# Patient Record
(demographics unavailable — no encounter records)

---

## 2017-02-13 NOTE — L&D FLOW SHEET
=================================================================

LD Flowsheet

=================================================================

Datetime Report Generated by CPN: 02/13/2017 20:00

   

   

=================================================================

Datetime: 02/13/2017 19:58

=================================================================

   

 I/O Interventions:  Clear Liquids Given (Crystal Peoria, RN)

   

=================================================================

Datetime: 02/13/2017 19:50

=================================================================

   

   

=================================================================

Uterine Activity

=================================================================

   

 Monitor Mode:  External; Palpation (Crystal Milad, RN)

 Frequency (min):  1 (Crystal Peoria, RN)

 Quality:  Mild (Crystal Milad, RN)

 Duration (sec):  50 (Crystal Peoria, RN)

 Resting Tone (Palpate):  Relaxed (Crystal Peoria, RN)

 Contraction Comments:  Pt reports most pain is in her back (Crystal

   Peoria, RN)

   

=================================================================

Fetal Assessment A

=================================================================

   

 Monitor Mode:  External US (Crystal Peoria, RN)

 FHR Baseline Rate :  145 (Crystal Peoria, RN)

 Variability:  Moderate 6-25 bpm (Crystal Milad, RN)

 Accelerations:  15X15 (Crystal Milad, RN)

 Decelerations:  None (Crystal Milad, RN)

   

=================================================================

Datetime: 02/13/2017 19:44

=================================================================

   

 NBP Sys/Alma Delia/Mean (mmHg):  103 (QS system process)

:  59 (QS system process)

:  78 (QS system process)

 Pulse:  96 (QS system process)

 LaborFlag:  OB Triage (QS system process)

   

=================================================================

Datetime: 02/13/2017 19:41

=================================================================

   

 I/O Interventions:  Up to BR (Crystal Peoria, RN)

   

=================================================================

Datetime: 02/13/2017 19:36

=================================================================

   

   

=================================================================

Pain

=================================================================

   

 Pain Scale:  4 (Crystal Peoria, RN)

 Pain Presence:  Intermittent (Crystal Milad, RN)

 Pain Type:  Sharp; Stabbing; Ache (Crystal Milad, RN)

 Pain Location:  Abdomen; Back; Perineum (Crystal Peoria, RN)

 Pain Goal:  1 (Crystal Milad, RN)

 Pain Relief Measures:  Comfort Measures (Crystal Milad, RN)

 Pain Coping:  Talking Through Contractions (Crystal Peoria, RN)

 Vaginal Bleeding:  None (Crystal Peoria, RN)

   

=================================================================

Maternal Assessment

=================================================================

   

 Level of Consciousness:  Fully Conscious (Crystal Peoria, RN)

 DTR's/Clonus:  DTRs 1+; No Clonus (Crystal Milad, RN)

 Headache:  Denies (Crystal Peoria, RN)

 Breath Sounds, Left:  Clear and Equal (Crystal Peoria, RN)

 Breath Sounds, Right:  Clear and Equal (Crystal Milad, RN)

 Nausea/Vomiting:  Denies (Crystal Peoria, RN)

 RUQ Epigastric Pain:  Denies (Crystal Imlad, RN)

 LaborFlag:  OB Triage (QS system process)

   

=================================================================

Datetime: 02/13/2017 19:23

=================================================================

   

   

=================================================================

Vital Signs

=================================================================

   

Stage of Pregnancy:  OB Triage (Ifrah Araujo, RN)

   

=================================================================

Vaginal Exam

=================================================================

   

 Dilatation (cm):  2.5 (Ifrah Araujo RN)

 Effacement (%):  50 (Ifrah Araujo RN)

 Station:  -2 (Ifrah Araujo RN)

 Exam by:  Dr. Leong (Ifrah Araujo RN)

 Vaginal Bleeding:  None (Ifrah Araujo RN)

 Cervix, Consistency:  Soft (Ifrah Araujo RN)

 Cervix, Position:  Midposition (Ifrah Araujo RN)

 Provider Reviewed Strip:  Yes (Ifrah Araujo RN)

 Strip Reviewed by:  ZAINAB araujo RN  (Ifrah Araujo RN)

   

=================================================================

Communication

=================================================================

   

 Communication:  Provider at Bedside (Crystal Milad, RN)

 Provider Notified (Name):  Salo (Crystal Milad, RN)

 Notification Reason:  Membrane Status (Crystal Peoria, RN)

   

=================================================================

Datetime: 02/13/2017 19:17

=================================================================

   

   

=================================================================

Patient Care

=================================================================

   

 Patient Position/Activity:  Supine (Crystal Peoria, RN)

## 2017-02-15 NOTE — NON STRESS TEST REPORT
=================================================================

Non Stress Test

=================================================================

Datetime Report Generated by CPN: 02/15/2017 06:26

   

   

=================================================================

DEMOGRAPHIC

=================================================================

   

Test Number:  2

EGA NST:  37.6

   

=================================================================

INDICATION

=================================================================

   

Indication for Study:  Ordered by Provider

Indication for Study (NST) Other:  LC

   

=================================================================

MONITORING

=================================================================

   

Monitor Explained:  Monitor Explained; Test Explained; Patient

   Verbalized Understanding

Time on Monitor:  02/15/2017 05:00

Time off Monitor:  02/15/2017 05:54

NST Duration:  54

   

=================================================================

NST INTERVENTIONS

=================================================================

   

NST Interventions:  PO Hydration; Reposition Patient

Physician Notified NST:  Dr. Valentine

   

=================================================================

BABY A

=================================================================

   

Fetal Movement :  Present

Contraction Frequency :  10-10.5

FHR Baseline :  130

Accelerations :  15X15

Decelerations :  None

Variability :  Moderate 6-25bpm

NST Review:  Meets Criteria for Reactive NST

NST Review and Verified By :  HERNANDO Mason RN

NST Results:  Reactive

   

=================================================================

NST REPORT

=================================================================

   

Report Trigger:  Send Report

## 2017-02-15 NOTE — NON STRESS TEST REPORT
=================================================================

Non Stress Test

=================================================================

Datetime Report Generated by CPN: 02/15/2017 04:50

   

   

=================================================================

DEMOGRAPHIC

=================================================================

   

Test Number:  1

EGA NST:  37.4

   

=================================================================

INDICATION

=================================================================

   

Indication for Study:  Ordered by Provider

   

=================================================================

VITAL SIGNS

=================================================================

   

Pulse - NST:  96

RESP - NST:  18

NBPSYS NST:  103

NBPDIA NST:  59

   

=================================================================

MONITORING

=================================================================

   

Monitor Explained:  Monitor Explained; Test Explained; Patient

   Verbalized Understanding

Time on Monitor:  02/13/2017 19:09

Time off Monitor:  02/13/2017 21:30

NST Duration:  141

   

=================================================================

NST INTERVENTIONS

=================================================================

   

NST Interventions:  PO Hydration; Oxytocin Challenge Test

Physician Notified NST:  Leong

BABY A:  [id number]

   

=================================================================

BABY A

=================================================================

   

Fetal Movement :  Present

Contraction Frequency :  5-10

FHR Baseline :  130

Accelerations :  15X15

Decelerations :  None

Variability :  Moderate 6-25bpm

NST Review:  Meets Criteria for Reactive NST

NST Review and Verified By :  LEONEL Vargas RN

NST Results:  Reactive

   

=================================================================

NST REPORT

=================================================================

   

Report Trigger:  Send Report

## 2017-02-15 NOTE — L&D FLOW SHEET
=================================================================

LD Flowsheet

=================================================================

Datetime Report Generated by CPN: 02/15/2017 14:00

   

   

=================================================================

Datetime: 02/15/2017 13:55

=================================================================

   

 NBP Sys/Alma Delia/Mean (mmHg):  120 (QS system process)

:  68 (QS system process)

:  87 (QS system process)

 Pulse:  91 (QS system process)

 LaborFlag:  OB Triage (QS system process)

   

=================================================================

Datetime: 02/15/2017 13:40

=================================================================

   

 NBP Sys/Alma Delia/Mean (mmHg):  106 (QS system process)

:  67 (QS system process)

:  81 (QS system process)

 Pulse:  93 (QS system process)

 LaborFlag:  OB Triage (QS system process)

   

=================================================================

Datetime: 02/15/2017 13:30

=================================================================

   

 Pitocin (milliunit):  Pitocin Started (milliunits) @ 4 (Mariaa

   Remi, RNC)

   

=================================================================

Datetime: 02/15/2017 13:26

=================================================================

   

 NBP Sys/Alma Delia/Mean (mmHg):  111 (QS system process)

:  60 (QS system process)

:  80 (QS system process)

 Pulse:  108 (QS system process)

 LaborFlag:  OB Triage (QS system process)

   

=================================================================

Datetime: 02/15/2017 13:12

=================================================================

   

 NBP Sys/Alma Delia/Mean (mmHg):  114 (QS system process)

:  53 (QS system process)

:  77 (QS system process)

 Pulse:  93 (QS system process)

 LaborFlag:  OB Triage (QS system process)

   

=================================================================

Datetime: 02/15/2017 13:04

=================================================================

   

 Pushing Position:  Pushing with Contractions (Mraiaa Khalil RN)

   

=================================================================

Datetime: 02/15/2017 13:00

=================================================================

   

 Dilatation (cm):  10.0 (SUHA Squires)

 Effacement (%):  100 (SUHA Squires)

 Station:  2 (SUHA Squires)

 Exam by:  MIREYA Diamond CNM (SUHA Squires)

   

=================================================================

Datetime: 02/15/2017 12:55

=================================================================

   

 NBP Sys/Alma Delia/Mean (mmHg):  113 (QS system process)

:  67 (QS system process)

:  84 (QS system process)

 Pulse:  87 (QS system process)

 LaborFlag:  OB Triage (QS system process)

   

=================================================================

Datetime: 02/15/2017 12:45

=================================================================

   

 Monitor Mode:  External; Palpation (Mariaa Remi, RNC)

 Frequency (min):  2.5-3 (Mariaa Remi, RNC)

 Quality:  Mild (Mariaa Remi, RNC)

 Duration (sec):  60-90 (Mariaa Remi, RNC)

 Duration Criteria:  Less than Two 120 Second Contractions (Mariaa

   Remi, RNC)

 Pattern:  Normal: <= 5 Contractions in 10 Minutes (Mariaa Remi,

   RNC)

 Resting Tone (Palpate):  Relaxed (Mariaa Remi, RNC)

 Monitor Mode:  External US (Mariaa Remi, RNC)

 FHR Baseline Rate :  135 (Mariaa Remi, RNC)

 Variability:  Moderate 6-25 bpm (Mariaa Remi, RNC)

 Accelerations:  15X15 (Mariaa Remi, RNC)

 Decelerations:  None (Mariaa Remi, RNC)

   

=================================================================

Datetime: 02/15/2017 12:40

=================================================================

   

 I/O Interventions:  [place]ashburn Cath Inserted (Mariaa Remi, RNC)

   

=================================================================

Datetime: 02/15/2017 12:39

=================================================================

   

 NBP Sys/Alma Delia/Mean (mmHg):  109 (QS system process)

:  67 (QS system process)

:  81 (QS system process)

 Pulse:  78 (QS system process)

 LaborFlag:  OB Triage (QS system process)

   

=================================================================

Datetime: 02/15/2017 12:38

=================================================================

   

 NBP Sys/Alma Delia/Mean (mmHg):  113 (QS system process)

:  67 (QS system process)

:  84 (QS system process)

 Pulse:  87 (QS system process)

 LaborFlag:  OB Triage (QS system process)

   

=================================================================

Datetime: 02/15/2017 12:37

=================================================================

   

 NBP Sys/Alma Delia/Mean (mmHg):  107 (QS system process)

:  63 (QS system process)

:  80 (QS system process)

 Pulse:  96 (QS system process)

 LaborFlag:  OB Triage (QS system process)

   

=================================================================

Datetime: 02/15/2017 12:36

=================================================================

   

 NBP Sys/Alma Delia/Mean (mmHg):  114 (QS system process)

:  77 (QS system process)

:  90 (QS system process)

 Pulse:  93 (QS system process)

 LaborFlag:  OB Triage (QS system process)

   

=================================================================

Datetime: 02/15/2017 12:35

=================================================================

   

 NBP Sys/Alma Delia/Mean (mmHg):  113 (QS system process)

:  68 (QS system process)

:  85 (QS system process)

 Pulse:  97 (QS system process)

 LaborFlag:  OB Triage (QS system process)

   

=================================================================

Datetime: 02/15/2017 12:34

=================================================================

   

 NBP Sys/Alma Delia/Mean (mmHg):  107 (QS system process)

:  61 (QS system process)

:  78 (QS system process)

 Pulse:  87 (QS system process)

 LaborFlag:  OB Triage (QS system process)

   

=================================================================

Datetime: 02/15/2017 12:33

=================================================================

   

 NBP Sys/Alma Delia/Mean (mmHg):  104 (QS system process)

:  60 (QS system process)

:  75 (QS system process)

 Pulse:  88 (QS system process)

 LaborFlag:  OB Triage (QS system process)

   

=================================================================

Datetime: 02/15/2017 12:32

=================================================================

   

 NBP Sys/Alma Delia/Mean (mmHg):  105 (QS system process)

:  60 (QS system process)

:  76 (QS system process)

 Pulse:  82 (QS system process)

 LaborFlag:  OB Triage (QS system process)

   

=================================================================

Datetime: 02/15/2017 12:31

=================================================================

   

 NBP Sys/Alma Delia/Mean (mmHg):  108 (QS system process)

:  63 (QS system process)

:  79 (QS system process)

 Pulse:  82 (QS system process)

 LaborFlag:  OB Triage (QS system process)

   

=================================================================

Datetime: 02/15/2017 12:30

=================================================================

   

 Monitor Mode:  External; Palpation (Mariaa Khalil RNC)

 Frequency (min):  3-4 (Mariaa Remi, RNC)

 Quality:  Moderate to Strong (Mariaa Remi, RNC)

 Duration (sec):  60-90 (Mariaa Remi, RNC)

 Duration Criteria:  Less than Two 120 Second Contractions (Mariaa

   Remi, RNC)

 Pattern:  Normal: <= 5 Contractions in 10 Minutes (Mariaa Remi,

   RNC)

 Resting Tone (Palpate):  Relaxed (Mariaa Remi, RNC)

 Monitor Mode:  External US (Mariaa Remi, RNC)

 FHR Baseline Rate :  135 (Mariaa Remi, RNC)

 Variability:  Moderate 6-25 bpm (Mariaa Remi, RNC)

 Accelerations:  15X15 (Mariaa Remi, RNC)

 Decelerations:  None (Mariaa Remi, RNC)

   

=================================================================

Datetime: 02/15/2017 12:27

=================================================================

   

 Pulse:  83 (QS system process)

 SpO2 (%):  100 (QS system process)

 LaborFlag:  OB Triage (QS system process)

   

=================================================================

Datetime: 02/15/2017 12:25

=================================================================

   

 Procedure Verify:  Correct Patient Identity; Correct Side and Site are

   Marked; Accurate Procedure Consent Form; Agreement on Procedure to

   be Done; Correct Patient Position; Relevant Images and Results are

   Properly Labeled and Displayed; Addressed Need to Administer

   Antibiotics or Fluids for Irrigation; Safety Precautions Based on

   Patient History or Medication Use (SUHA Squires)

 Anesthesia Plans:  Epidural (SUHA Squires)

   

=================================================================

Datetime: 02/15/2017 12:15

=================================================================

   

 IV/Blood Work:  New IV Bag Hung; IV Bag Number @ 3 (SUHA Squires)

   

=================================================================

Datetime: 02/15/2017 12:00

=================================================================

   

 Monitor Mode:  External; Palpation (SUHA Squires)

 Frequency (min):  5-6 (SUHA Squires)

 Quality:  Moderate to Strong (SUHA Squires)

 Duration (sec):  50-80 (SUHA Squires)

 Duration Criteria:  Less than Two 120 Second Contractions (SUHA Squires)

 Pattern:  Normal: <= 5 Contractions in 10 Minutes (SUHA Squires)

 Resting Tone (Palpate):  Relaxed (SUHA Squires)

 Monitor Mode:  External US (SUHA Squires)

 FHR Baseline Rate :  135 (SUHA Squires)

 Variability:  Moderate 6-25 bpm (SUHA Squires)

 Accelerations:  15X15 (SUHA Squires)

 Decelerations:  None (SUHA Squires)

## 2017-02-15 NOTE — L&D FLOW SHEET
=================================================================

LD Flowsheet

=================================================================

Datetime Report Generated by CPN: 02/15/2017 19:00

   

   

=================================================================

Datetime: 02/15/2017 15:40

=================================================================

   

 NBP Sys/Alma Delia/Mean (mmHg):  111 (QS system process)

:  58 (QS system process)

:  78 (QS system process)

 Pulse:  78 (QS system process)

   

=================================================================

Datetime: 02/15/2017 15:25

=================================================================

   

 NBP Sys/Alma Delia/Mean (mmHg):  115 (QS system process)

:  64 (QS system process)

:  82 (QS system process)

 Pulse:  96 (QS system process)

   

=================================================================

Datetime: 02/15/2017 15:10

=================================================================

   

Stage of Pregnancy:  Recovery (Mariaa Remi, RNC)

 NBP Sys/Alma Delia/Mean (mmHg):  108 (QS system process)

:  64 (QS system process)

:  80 (QS system process)

 Pulse:  93 (QS system process)

 Respirations:  17 (Mariaa Remi, RNC)

 Pain Scale:  0 (Mariaa Remi, RNC)

   

=================================================================

Datetime: 02/15/2017 14:55

=================================================================

   

Stage of Pregnancy:  Recovery (Mariaa Remi, RNC)

 NBP Sys/Alma Delia/Mean (mmHg):  113 (QS system process)

:  67 (QS system process)

:  85 (QS system process)

 Pulse:  100 (QS system process)

 Respirations:  16 (Mariaa Remi, RNC)

 Pain Scale:  0 (Mariaa Remi, RNC)

   

=================================================================

Datetime: 02/15/2017 14:40

=================================================================

   

Stage of Pregnancy:  Recovery (Mariaa Remi, RNC)

 NBP Sys/Alma Delia/Mean (mmHg):  112 (QS system process)

:  56 (QS system process)

:  76 (QS system process)

 Pulse:  69 (QS system process)

 Respirations:  17 (Mariaa Remi, RNC)

 Pain Scale:  0 (Mariaa Remi, RNC)

   

=================================================================

Datetime: 02/15/2017 14:10

=================================================================

   

Stage of Pregnancy:  Recovery (Mariaa Remi, RNC)

 NBP Sys/Alma Delia/Mean (mmHg):  110 (QS system process)

:  66 (QS system process)

:  82 (QS system process)

 Pulse:  76 (QS system process)

 Respirations:  16 (Mariaa Remi, RNC)

 Pain Scale:  0 (Mariaa Remi, RNC)

   

=================================================================

Datetime: 02/15/2017 13:55

=================================================================

   

Stage of Pregnancy:  Recovery (Mariaa Khalil, RNC)

 NBP Sys/Alma Delia/Mean (mmHg):  120 (QS system process)

:  68 (QS system process)

:  87 (QS system process)

 Pulse:  91 (QS system process)

 Respirations:  16 (Mariaa Remi, RNC)

 Pain Scale:  0 (Mariaa Remi, RNC)

   

=================================================================

Datetime: 02/15/2017 13:40

=================================================================

   

Stage of Pregnancy:  Recovery (Mariaa Khalil, RNC)

 NBP Sys/Alma Delia/Mean (mmHg):  106 (QS system process)

:  67 (QS system process)

:  81 (QS system process)

 Pulse:  93 (QS system process)

 Respirations:  17 (Mariaa Remi, RNC)

 Temperature (F):  98.0 (Mariaa Remi, RNC)

 Temperature (C):  36.7 (QS system process)

 Temperature Route:  Oral (Mariaa Remi, RNC)

 Pain Scale:  0 (Mariaa Remi, RNC)

   

=================================================================

Datetime: 02/15/2017 13:30

=================================================================

   

 Monitor Mode:  External; Palpation (Mariaa Remi, RNC)

 Frequency (min):  2-2.5 (Mariaa Remi, RNC)

 Quality:  Moderate to Strong (Mariaa Remi, RNC)

 Duration (sec):   (Mariaa Remi, RNC)

 Duration Criteria:  Less than Two 120 Second Contractions (Mariaa

   Remi, RNC)

 Pattern:  Normal: <= 5 Contractions in 10 Minutes (Mariaa Remi,

   RNC)

 Resting Tone (Palpate):  Relaxed (Mariaa Remi, RNC)

 Monitor Mode:  External US (Mariaa Remi, RNC)

 FHR Baseline Rate :  135 (Mariaa Remi, RNC)

 Variability:  Moderate 6-25 bpm (Mariaa Remi, RNC)

 Accelerations:  15X15 (Mariaa Remi, RNC)

 Decelerations:  None (Mariaa Remi, RNC)

 Pitocin (milliunit):  Pitocin Started (milliunits) @ 4 (Mariaa

   Remi, RNC)

   

=================================================================

Datetime: 02/15/2017 13:26

=================================================================

   

 NBP Sys/Alma Delia/Mean (mmHg):  111 (QS system process)

:  60 (QS system process)

:  80 (QS system process)

 Pulse:  108 (QS system process)

 LaborFlag:  OB Triage (QS system process)

   

=================================================================

Datetime: 02/15/2017 13:15

=================================================================

   

 Monitor Mode:  External; Palpation (Mariaa Remi, RNC)

 Frequency (min):  2.5-4 (Mariaa Remi, RNC)

 Quality:  Moderate to Strong (Mariaa Remi, RNC)

 Duration (sec):  60-90 (Mariaa Remi, RNC)

 Duration Criteria:  Less than Two 120 Second Contractions (Mariaa

   Remi, RNC)

 Pattern:  Normal: <= 5 Contractions in 10 Minutes (Mariaa Remi,

   RNC)

 Resting Tone (Palpate):  Relaxed (Mariaa Remi, RNC)

 Monitor Mode:  External US (Mariaa Remi, RNC)

 FHR Baseline Rate :  140 (Mariaa Remi, RNC)

 Variability:  Moderate 6-25 bpm (Mariaa Remi, RNC)

 Accelerations:  15X15 (Mariaa Remi, RNC)

 Decelerations:  None (Mariaa Remi, RNC)

 Pushing Position:  Pushing with Contractions (Mariaa Remi, RNC)

 Pushing Progress:  Descent with Pushing (Mariaa Remi, RNC)

   

=================================================================

Datetime: 02/15/2017 13:12

=================================================================

   

 NBP Sys/Alma Delia/Mean (mmHg):  114 (QS system process)

:  53 (QS system process)

:  77 (QS system process)

 Pulse:  93 (QS system process)

 LaborFlag:  OB Triage (QS system process)

   

=================================================================

Datetime: 02/15/2017 13:04

=================================================================

   

 Pushing Position:  Pushing with Contractions (Mariaa Khalil, RNC)

   

=================================================================

Datetime: 02/15/2017 13:00

=================================================================

   

 Monitor Mode:  External; Palpation (Mariaa Khalil, RNC)

 Frequency (min):  2.5-4 (Mariaa Khalil, RNC)

 Quality:  Moderate to Strong (Mariaa Remi, RNC)

 Duration (sec):  60-90 (Mariaa Remi, RNC)

 Duration Criteria:  Less than Two 120 Second Contractions (Mariaa Khalil, RNC)

 Pattern:  Normal: <= 5 Contractions in 10 Minutes (Mariaa Remi,

   RNC)

 Resting Tone (Palpate):  Relaxed (Mariaa Remi, RNC)

 Monitor Mode:  External US (Mariaa Khalil, RNC)

 FHR Baseline Rate :  135 (Mariaa Khalil, RNC)

 Variability:  Moderate 6-25 bpm (Mariaarandall Khalil, RNC)

 Accelerations:  None (Mariaarandall Khalil, RNC)

 Decelerations:  None (Mariaa Khalil, RNC)

 Dilatation (cm):  10.0 (Mariaa Khalil, RNC)

 Effacement (%):  100 (Mariaa Remi [facility])

 Station:  2 (Mariaa Remi [facility])

 Exam by:  A Lobo ESTRADA (Mariaa Khalil [facility])

   

=================================================================

Datetime: 02/15/2017 12:59

=================================================================

   

 Communication:  Provider at Bedside (Mariaadayne Khalil [facility])

 Communication Comments:  MIREYA Diamond CNM at  (Mariaa Khalil [facility])

   

=================================================================

Datetime: 02/15/2017 12:55

=================================================================

   

 NBP Sys/Alma Delia/Mean (mmHg):  113 (QS system process)

:  67 (QS system process)

:  84 (QS system process)

 Pulse:  87 (QS system process)

 LaborFlag:  OB Triage (QS system process)

   

=================================================================

Datetime: 02/15/2017 12:45

=================================================================

   

 Monitor Mode:  External; Palpation (Mariaa Remi, RNC)

 Frequency (min):  2.5-3 (Mariaa Remi, RNC)

 Quality:  Mild (Mariaa Ermi, RNC)

 Duration (sec):  60-90 (Mariaa Remi, RNC)

 Duration Criteria:  Less than Two 120 Second Contractions (Mariaa

   Remi, RNC)

 Pattern:  Normal: <= 5 Contractions in 10 Minutes (Mariaa Remi,

   RNC)

 Resting Tone (Palpate):  Relaxed (Mariaa Remi, RNC)

 Monitor Mode:  External US (Mariaa Remi, RNC)

 FHR Baseline Rate :  135 (Mariaa Remi, RNC)

 Variability:  Moderate 6-25 bpm (Mariaa Remi, RNC)

 Accelerations:  15X15 (Mariaa Remi, RNC)

 Decelerations:  None (Mariaa Remi, RNC)

   

=================================================================

Datetime: 02/15/2017 12:40

=================================================================

   

 I/O Interventions:  [place] Cath Inserted (Mariaa Remi, RNC)

   

=================================================================

Datetime: 02/15/2017 12:39

=================================================================

   

 NBP Sys/Alma Delia/Mean (mmHg):  109 (QS system process)

:  67 (QS system process)

:  81 (QS system process)

 Pulse:  78 (QS system process)

 LaborFlag:  OB Triage (QS system process)

   

=================================================================

Datetime: 02/15/2017 12:38

=================================================================

   

 NBP Sys/Alma Delia/Mean (mmHg):  113 (QS system process)

:  67 (QS system process)

:  84 (QS system process)

 Pulse:  87 (QS system process)

 LaborFlag:  OB Triage (QS system process)

   

=================================================================

Datetime: 02/15/2017 12:37

=================================================================

   

 NBP Sys/Alma Delia/Mean (mmHg):  107 (QS system process)

:  63 (QS system process)

:  80 (QS system process)

 Pulse:  96 (QS system process)

 LaborFlag:  OB Triage (QS system process)

   

=================================================================

Datetime: 02/15/2017 12:36

=================================================================

   

 NBP Sys/Alma Delia/Mean (mmHg):  114 (QS system process)

:  77 (QS system process)

:  90 (QS system process)

 Pulse:  93 (QS system process)

 LaborFlag:  OB Triage (QS system process)

   

=================================================================

Datetime: 02/15/2017 12:35

=================================================================

   

 NBP Sys/Alma Delia/Mean (mmHg):  113 (QS system process)

:  68 (QS system process)

:  85 (QS system process)

 Pulse:  97 (QS system process)

 LaborFlag:  OB Triage (QS system process)

   

=================================================================

Datetime: 02/15/2017 12:34

=================================================================

   

 NBP Sys/Alma Delia/Mean (mmHg):  107 (QS system process)

:  61 (QS system process)

:  78 (QS system process)

 Pulse:  87 (QS system process)

 LaborFlag:  OB Triage (QS system process)

   

=================================================================

Datetime: 02/15/2017 12:33

=================================================================

   

 NBP Sys/Alma Delia/Mean (mmHg):  104 (QS system process)

:  60 (QS system process)

:  75 (QS system process)

 Pulse:  88 (QS system process)

 LaborFlag:  OB Triage (QS system process)

   

=================================================================

Datetime: 02/15/2017 12:32

=================================================================

   

 NBP Sys/Alma Delia/Mean (mmHg):  105 (QS system process)

:  60 (QS system process)

:  76 (QS system process)

 Pulse:  82 (QS system process)

 LaborFlag:  OB Triage (QS system process)

   

=================================================================

Datetime: 02/15/2017 12:31

=================================================================

   

 NBP Sys/Alma Delia/Mean (mmHg):  108 (QS system process)

:  63 (QS system process)

:  79 (QS system process)

 Pulse:  82 (QS system process)

 LaborFlag:  OB Triage (QS system process)

   

=================================================================

Datetime: 02/15/2017 12:30

=================================================================

   

 Monitor Mode:  External; Palpation (Mariaa Remi, RNC)

 Frequency (min):  3-4 (Mariaa Remi, RNC)

 Quality:  Moderate to Strong (Mariaa Remi, RNC)

 Duration (sec):  60-90 (Mariaa Remi, RNC)

 Duration Criteria:  Less than Two 120 Second Contractions (Mariaa

   Remi, RNC)

 Pattern:  Normal: <= 5 Contractions in 10 Minutes (Mariaa Remi,

   RNC)

 Resting Tone (Palpate):  Relaxed (Mariaa Remi, RNC)

 Monitor Mode:  External US (Mariaa Remi, RNC)

 FHR Baseline Rate :  135 (Mariaa Remi, RNC)

 Variability:  Moderate 6-25 bpm (Mariaa Remi, RNC)

 Accelerations:  15X15 (Mariaa Remi, RNC)

 Decelerations:  None (Mariaa Remi, RNC)

   

=================================================================

Datetime: 02/15/2017 12:27

=================================================================

   

 Pulse:  83 (QS system process)

 SpO2 (%):  100 (QS system process)

 LaborFlag:  OB Triage (QS system process)

   

=================================================================

Datetime: 02/15/2017 12:25

=================================================================

   

 Procedure Verify:  Correct Patient Identity; Correct Side and Site are

   Marked; Accurate Procedure Consent Form; Agreement on Procedure to

   be Done; Correct Patient Position; Relevant Images and Results are

   Properly Labeled and Displayed; Addressed Need to Administer

   Antibiotics or Fluids for Irrigation; Safety Precautions Based on

   Patient History or Medication Use (SUHA Squires)

 Anesthesia Plans:  Epidural (SUHA Squires)

   

=================================================================

Datetime: 02/15/2017 12:15

=================================================================

   

 IV/Blood Work:  New IV Bag Hung; IV Bag Number @ 3 (Mariaa Remi,

   RNC)

   

=================================================================

Datetime: 02/15/2017 12:00

=================================================================

   

 Monitor Mode:  External; Palpation (Mariaa Remi, RNC)

 Frequency (min):  5-6 (Mariaa Remi, RNC)

 Quality:  Moderate to Strong (Mariaa Remi, RNC)

 Duration (sec):  50-80 (Mariaa Remi, RNC)

 Duration Criteria:  Less than Two 120 Second Contractions (Mariaa

   Remi, RNC)

 Pattern:  Normal: <= 5 Contractions in 10 Minutes (Mariaa Remi,

   RNC)

 Resting Tone (Palpate):  Relaxed (Mariaa Remi, RNC)

 Monitor Mode:  External US (Mariaa Remi, RNC)

 FHR Baseline Rate :  135 (Mariaa Remi, RNC)

 Variability:  Moderate 6-25 bpm (Mariaa Remi, RNC)

 Accelerations:  15X15 (Mariaa Remi, RNC)

 Decelerations:  None (Mariaa Remi, RNC)

   

=================================================================

Datetime: 02/15/2017 11:30

=================================================================

   

 Monitor Mode:  External; Palpation (Mariaa Remi, RNC)

 Frequency (min):  3-5 (Mariaa Khalil, RNC)

 Quality:  Moderate to Strong (Mariaa Khalil, RNC)

 Duration (sec):  50-80 (Mariaa Khalil, RNC)

 Duration Criteria:  Less than Two 120 Second Contractions (Mariaa Khalil, RNC)

 Pattern:  Normal: <= 5 Contractions in 10 Minutes (Mariaa Khalil,

   RNC)

 Resting Tone (Palpate):  Relaxed (Mariaa Khalil, RNC)

 Monitor Mode:  External US (Mariaa Khalil, RNC)

 FHR Baseline Rate :  135 (Mariaa Khalil, RNC)

 Variability:  Moderate 6-25 bpm (Mariaa Remi, RNC)

 Accelerations:  15X15 (Mariaa Khalil, RNC)

 Decelerations:  None (Mariaa Khalil, RNC)

   

=================================================================

Datetime: 02/15/2017 11:16

=================================================================

   

 Dilatation (cm):  8.0 (Mariaa Khalil, RNC)

 Effacement (%):  90 (Mariaa Khalil, RNC)

 Exam by:  MIREYA Diamond CNM (Mariaa Khalil, RNC)

 Membrane Status:  Ruptured (Mariaa Khalil, RNC)

 Membranes Rupture Method:  Artificial (Mariaa Khalil, RNC)

 Amniotic Fluid Color:  Clear (Mariaa Khalil, RNC)

 Amniotic Fluid Amount:  Moderate (Mariaa Khalil, RNC)

   

=================================================================

Datetime: 02/15/2017 11:00

=================================================================

   

 Monitor Mode:  External (Tammie Marlatt, RN)

 Frequency (min):  2.5-6 (Tammie Marlatt, RN)

 Quality:  Moderate (Tammie Marlatt, RN)

 Duration (sec):  60 (Tammie Marlatt, RN)

 Resting Tone (Palpate):  Relaxed (Tammie Marlatt, RN)

 Monitor Mode:  External US (Tammie Marlatt, RN)

 FHR Baseline Rate :  140 (Tammie Marlatt, RN)

 Variability:  Moderate 6-25 bpm (Tammie Marlatt, RN)

 Accelerations:  15X15 (Tammie Marlatt, RN)

 Decelerations:  None (Tammie Marlatt, RN)

 IV/Blood Work:  IV Bag Number @ 2 (Mariaa Khalil [facility])

   

=================================================================

Datetime: 02/15/2017 10:30

=================================================================

   

 Monitor Mode:  External (Tammie Marlatt, RN)

 Frequency (min):  4.5-5.5 (Tammie Marlatt, RN)

 Quality:  Moderate (Tammie Marlatt, RN)

 Duration (sec):  50 (Tammie Marlatt, RN)

 Resting Tone (Palpate):  Relaxed (Tammie Marlatt, RN)

 Monitor Mode:  External US (Tammie Marlatt, RN)

 FHR Baseline Rate :  140 (Tammie Marlatt, RN)

 Variability:  Moderate 6-25 bpm (Tammie Browning RN)

 Accelerations:  15X15 (Tammie Browning RN)

 Decelerations:  None (Tammie Browning RN)

   

=================================================================

Datetime: 02/15/2017 10:15

=================================================================

   

 IV/Blood Work:  IV Started; IV Bolus Started; New IV Bag Hung (Carmel Ibrahim RN)

 Communication:  RN at Bedside (Carmel Ibrahim RN)

   

=================================================================

Datetime: 02/15/2017 10:12

=================================================================

   

 NBP Sys/Alma Delia/Mean (mmHg):  115 (QS system process)

:  74 (QS system process)

:  90 (QS system process)

 Pulse:  81 (QS system process)

 Respirations:  17 (SUHA Squires)

 Pain Scale:  4 (SUHA Squires)

 Pain Presence:  Intermittent (SUHA Squires)

 Pain Type:  Cramping; Contraction (SUHA Squires)

 Pain Location:  Abdomen; Back (SUHA Squires)

 Pain Coping:  Talking Through Contractions; Breathing Through

   Contractions; Requesting Pain Medication or Epidural (SUHA Squires)

 Level of Consciousness:  Fully Conscious (SHUA Squires)

 DTR's/Clonus:  DTRs 2+; No Clonus (SUHA Squires)

 Headache:  Denies (SUHA Squires)

 Breath Sounds, Left:  Clear and Equal (SUHA Squires)

 Breath Sounds, Right:  Clear and Equal (SUHA Squires)

 Nausea/Vomiting:  Denies (SUHA Squires)

 RUQ Epigastric Pain:  Denies (SUHA Squires)

 Instructional Method:  Verbal; Patient Instructed (SUHA Squires)

 Plan of Care:  Plan of Care Discussed; Vaginal Delivery; Labor

   (SUHA Squires)

 Unit Routine:  [place] to Room; Call Bell; Unit Personnel; Consents

   Signed; Fetal Monitoring; Bathroom Privileges; Routine Time Outs;

   Medications (SUHA Squires)

 Labor/Induction:  Labor Stages; Artificial Rupture of Membranes;

   Activity (SUHA Squires)

 Pain Management:  IV Narcotics; Epidural; Pain Scale/Goals; Comfort

   Measures (SUHA Squires)

 Medications:  IV Narcotics (SUHA Squires)

 LaborFlag:  OB Triage (QS system process)

## 2017-02-15 NOTE — L&D FLOW SHEET
=================================================================

LD Flowsheet

=================================================================

Datetime Report Generated by CPN: 02/15/2017 12:00

   

   

=================================================================

Datetime: 02/15/2017 11:30

=================================================================

   

 Monitor Mode:  External; Palpation (Mariaa Remi, RNC)

 Frequency (min):  3-5 (Mariaa Remi, RNC)

 Quality:  Moderate to Strong (Mariaa Remi, RNC)

 Duration (sec):  50-80 (Mariaa Remi, RNC)

 Duration Criteria:  Less than Two 120 Second Contractions (Mariaa

   Remi, RNC)

 Pattern:  Normal: <= 5 Contractions in 10 Minutes (Mariaa Remi,

   RNC)

 Resting Tone (Palpate):  Relaxed (Mariaa Remi, RNC)

 Monitor Mode:  External US (Mariaa Remi, RNC)

 FHR Baseline Rate :  135 (Mariaa Remi, RNC)

 Variability:  Moderate 6-25 bpm (Mariaa Remi, RNC)

 Accelerations:  15X15 (Mariaa Remi, RNC)

 Decelerations:  None (Mariaa Remi, RNC)

   

=================================================================

Datetime: 02/15/2017 11:16

=================================================================

   

 Dilatation (cm):  8.0 (Mariaa Khalil, EDDIEC)

 Effacement (%):  90 (Mariaa Khalil, RNC)

 Exam by:  MIREYA Diamond CNM (Mariaa Khalil, RNC)

 Membrane Status:  Ruptured (Mariaa Khalil, RNC)

 Membranes Rupture Method:  Artificial (Mariaa Khalil, RNC)

 Amniotic Fluid Color:  Clear (Mariaa Khalil, RNC)

 Amniotic Fluid Amount:  Moderate (Mariaa Khalil, RNC)

   

=================================================================

Datetime: 02/15/2017 11:00

=================================================================

   

 Monitor Mode:  External (Tammie Browning RN)

 Frequency (min):  2.5-6 (Tammie Browning, RN)

 Quality:  Moderate (Tammie Browning RN)

 Duration (sec):  60 (Tammie Browning RN)

 Resting Tone (Palpate):  Relaxed (Tammie Browning RN)

 Monitor Mode:  External US (Tammie Browning RN)

 FHR Baseline Rate :  140 (Tammie Browning RN)

 Variability:  Moderate 6-25 bpm (Tammie Browning RN)

 Accelerations:  15X15 (Tammie Marlatt, RN)

 Decelerations:  None (Tammie Marlatt, RN)

   

=================================================================

Datetime: 02/15/2017 10:30

=================================================================

   

 Monitor Mode:  External (Tammie Marlatt, RN)

 Frequency (min):  4.5-5.5 (Tammie Marlatt, RN)

 Quality:  Moderate (Tammie Marlatt, RN)

 Duration (sec):  50 (Tammie Marlatt, RN)

 Resting Tone (Palpate):  Relaxed (Tammie Marlatt, RN)

 Monitor Mode:  External US (Tammie Marlatt, RN)

 FHR Baseline Rate :  140 (Tammie Marlatt, RN)

 Variability:  Moderate 6-25 bpm (Tammie Marlatt, RN)

 Accelerations:  15X15 (Tammie Marlatt, RN)

 Decelerations:  None (Tammie Marlatt, RN)

   

=================================================================

Datetime: 02/15/2017 10:15

=================================================================

   

 IV/Blood Work:  IV Started; IV Bolus Started; New IV Bag Hung (Carmel Ibrahim RN)

 Communication:  RN at Bedside (Carmel Ibrahim RN)

   

=================================================================

Datetime: 02/15/2017 10:12

=================================================================

   

 NBP Sys/Alma Delia/Mean (mmHg):  115 (QS system process)

:  74 (QS system process)

:  90 (QS system process)

 Pulse:  81 (QS system process)

 Respirations:  17 (SUHA Squires)

 Pain Scale:  4 (SUHA Squires)

 Pain Presence:  Intermittent (SUHA Squires)

 Pain Type:  Cramping; Contraction (SUHA Squires)

 Pain Location:  Abdomen; Back (SUHA Squires)

 Pain Coping:  Talking Through Contractions; Breathing Through

   Contractions; Requesting Pain Medication or Epidural (SUHA Squires)

 Level of Consciousness:  Fully Conscious (SUHA Squires)

 DTR's/Clonus:  DTRs 2+; No Clonus (SUHA Squires)

 Headache:  Denies (SUHA Squires)

 Breath Sounds, Left:  Clear and Equal (SUHA Squires)

 Breath Sounds, Right:  Clear and Equal (SUHA Sqiures)

 Nausea/Vomiting:  Denies (SUHA Squires)

 RUQ Epigastric Pain:  Denies (SUHA Squires)

 Instructional Method:  Verbal; Patient Instructed (SUHA Squires)

 Plan of Care:  Plan of Care Discussed; Vaginal Delivery; Labor

   (SUHA Squires)

 Unit Routine:  [place] to Room; Call Bell; Unit Personnel; Consents

   Signed; Fetal Monitoring; Bathroom Privileges; Routine Time Outs;

   Medications (SUHA Squires)

 Labor/Induction:  Labor Stages; Artificial Rupture of Membranes;

   Activity (SUHA Squires)

 Pain Management:  IV Narcotics; Epidural; Pain Scale/Goals; Comfort

   Measures (SUHA Squires)

 Medications:  IV Narcotics (SUHA Squires)

 LaborFlag:  OB Triage (QS system process)

   

=================================================================

Datetime: 02/15/2017 06:16

=================================================================

   

 Antiemetics/Antacids:  Vistaril (mg) @ (Annotations: 50) (Vika Brown RN)

 Patient Care Comments:  Discussed term pregnancy signs and symptoms

   with patient and spouse; both verbalized understanding; patient to

   keep appointment in office today (Vika Brown RN)

   

=================================================================

Datetime: 02/15/2017 06:03

=================================================================

   

 Communication:  RN Reviewed Strip; Provider Orders Received; Report

   Given to @ Dr. Valentine (Vika Brown RN)

 Communication Comments:  Informed Dr. Valentine of patient's complaint,

   history, urine results, pain level and FHR/Contractions; orders

   received for Vistaril 50 mg and discharge home (Vika Brown RN)

   

=================================================================

Datetime: 02/15/2017 05:45

=================================================================

   

 Monitor Mode:  External; Palpation (Vika Field, RN)

 Frequency (min):  10-10.5 (Vika Field, RN)

 Quality:  Mild/Moderate (Vika Field, RN)

 Duration (sec):  60-80 (Vika Field, RN)

 Resting Tone (Palpate):  Relaxed (Vika Field, RN)

 Monitor Mode:  External US (Vika Field, RN)

 FHR Baseline Rate :  130 (Vika Field, RN)

 Variability:  Moderate 6-25 bpm (Vika Field, RN)

 Accelerations:  15X15 (Vika Field, RN)

 Decelerations:  None (Vika Field, RN)

   

=================================================================

Datetime: 02/15/2017 05:34

=================================================================

   

 NBP Sys/Alma Delia/Mean (mmHg):  93 (QS system process)

:  54 (QS system process)

:  68 (QS system process)

 Pulse:  62 (QS system process)

 LaborFlag:  OB Triage (QS system process)

   

=================================================================

Datetime: 02/15/2017 05:18

=================================================================

   

 Temperature (F):  97.6 (Vika Field, RN)

 Temperature (C):  36.4 (QS system process)

 LaborFlag:  OB Triage (QS system process)

   

=================================================================

Datetime: 02/15/2017 05:15

=================================================================

   

 Monitor Mode:  External; Palpation (Vika Field, RN)

 Frequency (min):  x1 (Vika Field, RN)

 Quality:  Mild/Moderate (Vika Field, RN)

 Duration (sec):  80 (Vika Field, RN)

 Resting Tone (Palpate):  Relaxed (Vika Field, RN)

 Monitor Mode:  External US (Vika Field, RN)

 FHR Baseline Rate :  125 (Vika Field, RN)

 Variability:  Moderate 6-25 bpm (Vika Field, RN)

 Accelerations:  15X15 (Vika Field, RN)

 Decelerations:  None (Vika Field, RN)

   

=================================================================

Datetime: 02/15/2017 05:10

=================================================================

   

 Dilatation (cm):  2.0 (Vika Brown RN)

 Effacement (%):  50 (Vika Brwon RN)

 Station:  -2 (Vika Brown RN)

 Exam by:  EDDIE Tovar (Vika Brown RN)

   

=================================================================

Datetime: 02/15/2017 05:04

=================================================================

   

 Frequency (min):  Q4-5 MINUTES (Vika Brown RN)

 Pain Scale:  5 (Vika Brown RN)

 Pain Presence:  Intermittent (Vika Brown RN)

 Pain Type:  Cramping; Contraction (Vika Brown RN)

 Pain Location:  Abdomen; Back (Vika Brown RN)

 Pain Goal:  0 (Vika Brown RN)

 Pain Relief Measures:  Comfort Measures (Vika Brown RN)

 Pain Coping:  Talking Through Contractions; Breathing Through

   Contractions (Vika Brown RN)

 Vaginal Bleeding:  None (Vika Brown RN)

 Level of Consciousness:  Fully Conscious (Vika Brown RN)

 DTR's/Clonus:  DTRs 2+; No Clonus (Vika Brown RN)

 Headache:  Denies (Vika Brown RN)

 Breath Sounds, Left:  Clear and Equal (Vika Brown RN)

 Breath Sounds, Right:  Clear and Equal (Vika Brown RN)

 Nausea/Vomiting:  Denies (Vika Brown RN)

 RUQ Epigastric Pain:  Denies (Vika Brown RN)

 Instructional Method:  Verbal; Patient Instructed; Family/Support

   Person Instructed; Verbalized Understanding (Vika Brown RN)

 Unit Routine:  [place] to Room; Call [place]; Bed; Visiting Policy;

   Waiting Areas; Phone/Cell Phone Use; Unit Personnel; Handwashing;

   Flu/Illness Precautions; Fetal Monitoring; Safety/Fall Risk

   Prevention; Bathroom Privileges (Vika Brown RN)

 LaborFlag:  OB Triage (QS system process)

   

=================================================================

Datetime: 02/15/2017 05:03

=================================================================

   

 NBP Sys/Alma Delia/Mean (mmHg):  109 (QS system process)

:  64 (QS system process)

:  80 (QS system process)

 Pulse:  69 (QS system process)

 LaborFlag:  OB Triage (QS system process)

## 2017-02-15 NOTE — DELIVERY SUMMARY
=================================================================

Del Sum A-C

=================================================================

Datetime Report Generated by CPN: 02/15 16:37

   

   

=================================================================

ADMISSION DATA

=================================================================

   

Chief Complaint:  Uterine Contractions

Admission Impression:  Term, Intrauterine Pregnancy

Admit Provider Comments:  22 yo  EDc  (Annotations: Data stored by

   CP on behalf of user)

   

=================================================================

DELIVERY PERSONNEL

=================================================================

   

Delivery Doctor::  Nehemias Diamond CNM

Labor and Delivery Nurse::  Mariaa Khalil RN

Nursery Nurse::  Ivonne Lees RN

Scrub Tech/CNA:  Rocio Arcos CNA II

   

=================================================================

MATERNAL INFORMATION

=================================================================

   

Delivery Anesthesia:  Epidural

Medications After Delivery:  Pitocin Bolus-Please Comment; Pitocin Drip

   20 Units/1000ml NSS

Estimated  Blood Loss (ml):  250

Provider Comments:  poor maternal effort with pushing

   pt having a difficult time pushing effectively

   repositioned to right tilt with hiproll

   delivery of vfi bulb OA to EDU

   suctioned on perinuem

   infant to abdomen 

   tactile stimulation elicits spontaneous cry

   cord clamped 

   cut by mother of pt

   3vc

   placenta delivered in maxwell fashion

   noted small bleeds on sac- placenta to pathology

   EBL 250cc 

   1st degree perineal laceration

   hemostasis achieved

   

=================================================================

LABOR SUMMARY

=================================================================

   

EDC:  2017 00:00

No. Babies in Womb:  1

 Attempted:  No

Labor Anesthesia:  Epidural

   

=================================================================

LABOR INFORMATION

=================================================================

   

Reason for Induction:  Not Applicable

Onset of Labor:  02/15/2017 06:00

Complete Dilatation:  02/15/2017 13:00

Oxytocin:  Augmentation

Group B Beta Strep:  Negative

Antibiotics # of Doses:  0

Steroids Given:  None

Reason Steroids Not Administered:  Not Applicable

   

=================================================================

MEMBRANES

=================================================================

   

Membranes Rupture Method:  Artificial

Rupture of Membranes:  02/15/2017 11:16

Length of Rupture (hr):  2.28

Amniotic Fluid Color:  Clear

Amniotic Fluid Amount:  Moderate

   

=================================================================

STAGES OF LABOR

=================================================================

   

Stage 1 hr:  7

Stage 1 min:  0

Stage 2 hr:  0

Stage 2 min:  33

Stage 3 hr:  0

Stage 3 min:  3

Total Time in Labor hr:  7

Total Time in Labor min:  36

   

=================================================================

VAGINAL DELIVERY

=================================================================

   

Episiotomy:  None

Laceration Extension:  First Degree

Laceration Type:  Perineal

Laceration Repair:  Yes

Laceration Repair Note:  repaired in usual fashion under epidural

Sponge Count Correct:  N/A

Sharps Count Correct:  N/A

   

=================================================================

CSECTION DELIVERY

=================================================================

   

Primary Indication:  N/A

Secondary Indication:  N/A

CSection Incidence:  N/A

Labor:  N/A

Elective:  N/A

CSection Incision:  N/A

   

=================================================================

BABY A INFORMATION

=================================================================

   

Infant Delivery Date/Time:  02/15/2017 13:33

Method of Delivery:  Vaginal

Born in Route :  No

:  N/A

Forceps:  N/A

Vacuum Extraction:  N/A

Shoulder Dystocia :  No

   

=================================================================

PRESENTATION/POSITION BABY A

=================================================================

   

Presentation:  Cephalic

Cephalic Presentation:  Vertex

Vertex Position:  Left Occipital Anterior

Breech Presentation:  N/A

   

=================================================================

PLACENTA INFORMATION BABY A

=================================================================

   

Placenta Delivery Time :  02/15/2017 13:36

Placenta Method of Delivery:  Spontaneous

Placenta Status:  Delivered

   

=================================================================

APGAR SCORES BABY A

=================================================================

   

Heart Rate 1 min:  >100 bpm

Resp Effort 1 min:  Good Cry

Reflex Irritability 1 min:  Cough or Sneeze or Pulls Away

Muscle Tone 1 min:  Active Motion

Color 1 min:  Blue/Pale

Resuscitation Effort 1 min:  Tactile Stimulation

APGAR SCORE 1 MIN:  8

Heart Rate 5 min:  >100 bpm

Resp Effort 5 min:  Good Cry

Reflex Irritability 5 min:  Cough or Sneeze or Pulls Away

Muscle Tone 5 min:  Active Motion

Color 5 min:  Body Pink, Extremities Blue

Resuscitation Effort 5 min:  N/A

APGAR SCORE 5 MIN:  9

Resuscitation Effort 10 min:  N/A

   

=================================================================

INFANT INFORMATION BABY A

=================================================================

   

Gestational Age at Delivery:  37.6

Gestational Status:  Early Term- 37- 38.6 Weeks

Infant Outcome :  Liveborn

Infant Condition :  Stable

Infant Sex:  Male

   

=================================================================

IDENTIFICATION BABY A

=================================================================

   

Infant Verification Date/Time:  02/15/2017 13:59

ID Band Number:  [id number]

Mother's Name Verified:  Yes

Infant Medical Record Number:  [id number]

RN Verifying Infant:  EDWIGE TUCKER RN

Additional Verifying Personnel:  BIJAN JOHNSON RN

   

=================================================================

WEIGHT/LENGTH BABY A

=================================================================

   

Infant Birthweight (gm):  2865

Infant Weight (lb):  6

Infant Weight (oz):  5

Infant Length (in):  19.00

Infant Length (cm):  48.26

   

=================================================================

CORD INFORMATION BABY A

=================================================================

   

No. Cord Vessels:  3

Nuchal Cord :  N/A

Cord Blood Taken:  Yes-For Eval (Mom's Blood Type - or O+)

Infant Suction:  Mouth; Nose

   

=================================================================

ASSESSMENT BABY A

=================================================================

   

Infant Complications:  None

Physical Findings at Delivery:  Within Normal Limits

Infant Respirations:  Appears Normal

Skin to Skin:  Yes

Neonatologist/ALS Called :  No

Infant Care By:  HERNANDO BORDEN

Transferred To:  Remains with Mother

   

=================================================================

BABY B INFORMATION

=================================================================

   

 :  N/A

   

=================================================================

SIGNATURES

=================================================================

   

Assignment:  Rick Garcia MD

Signature:  Electronically signed by Nehemias Diamond CNM on 2/15/2017 at

   14:15  with User ID: Patricia

:  Electronically signed by Nehemias Diamond CNM on 2/15/2017 at 14:15 

   with User ID: Patricia

## 2017-02-16 NOTE — L&D CARE PLAN
=================================================================

LD CARE PLANS

=================================================================

Datetime Report Generated by CPN: 2017 06:15

   

   

=================================================================

Datetime: 02/15/2017 10:36

=================================================================

   

   

=================================================================

Pain

=================================================================

   

 State:  Risk For (SUHA Squires)

 Related To:  Labor and Delivery Process (SUHA Squires)

 Goal(s):  Patients Pain will be Assessed and Managed; Patient will

   Verbalize Adequate Relief of Pain or the Ability to [place] with

   Current Pain (SUHA Squires)

 Interventions:  Assess Pain Severity on Scale of 0 (None) to 5

   (Severe); Assess Type, Location and Intensity of Pain Each Time

   Client Reports Discomfort and Notify Provider if Unusal Pain

   Develops; Encourage Proper Breathing and Relaxation Techniques;

   Offer Alternatives Such as Repositioning, Calm Environment,

   Massages, Diversional Activities, Ice Pack, Splinting, and

   Ambulation; Administer Analgesics as Ordered; Assist with Epidural

   Placement as Appropriate; Evaluate Therapeutic Effectiveness of

   Medication and Treatments (SUHA Squires)

 Outcome:  Patient will Report Absence or Relief of Pain Consistent

   with Established Pain Goal (SUHA Squires)

 Outcome:  Patient will have a Decrease in Signs and Symptoms of

   Discomfort (SUHA Squires)

 Outcome:  Pain will be Controlled During Procedures (SUHA Squires)

   

=================================================================

Anxiety

=================================================================

   

 State:  Risk For (SUHA Squires)

 Related To:  Labor and Delivery Process; Surgical Procedure; Perceived

   or Actual Threat to Pregnancy; Fear of Unknown; Situational Crisis;

   Medical Interventions; Significant Life Event (SUHA Squires)

 Goal(s):  Patient will have Decreased Anxiety and be able to Function

   at Acceptable Levels (SUHA Squires)

 Interventions:  Assess Verbal and Nonverbal  Behavioral Indicators of

   Anxiety; Assist Patient to Identify and Verbalize Symptoms of

   Anxiety; Identify and Demonstrate Techniques to Control Anxiety;

   Assist Patient with Coping Mechanisms to Manage Anxiety; Provide

   Theraputic Touch for the Patient; Explain to Patient, Using a Calm

   Reassuring Approach and Nonmedical Terms, All Activities,

   Procedures, and Concerns; Instruct Patient and Family about Post

   Discharge Care, Limitations, Symptoms to Report and Resources

   Available (SUHA Squires)

 Outcome:  Patient will Identify, Verbalize and Demonstrate Techniques

   to Control Anxiety (SUHA Squires)

 Outcome:  Patient's Posture, Facial Expressions, Gestures and Activity

   Level will Reflect Decreased Anxiety (SUHA Squires)

 Outcome:  Patient will Verbalize a Sense of Control and/or Acceptance

   of the Situation (SUHA Squires)

 Outcome:  Patient will Identify and Utilize Support Person (SUHA Squires)

   

=================================================================

Knowledge Deficit

=================================================================

   

 State:  Risk For (SUHA Squires)

 Related To:  Labor and Delivery Process; Surgical Procedures

   (SUHA Squires)

 Goal(s):  Patient will Accurately Verbalize Understanding of Plan of

   Care and Treatment; Patient and Family will Accurately Verbalize

   Understanding of the Disease Process (SUHA Squires)

 Interventions:  Assess Motivation and Willingness of Patient/Family to

   Learn; Assess Preferred Learning Mode: One to One Instruction,

   Reading, Videos, Group Discussion or Demonstration; Assess Barriers

   to Learning: Pain, Emotional State, Language Barrier, Cognitive

   Impairment, Visual or Hearing Deficits; Assess Patient and Family

   Knowledge of Disease Process, Medications and Treatment; Discuss

   Therapy and/or Treatment Options, Describe Rationale Behind

   Management, Therapy and Treatment Recommendations; Instruct Patient

   and Family on Signs and Symptoms to Report; Instruct Patient and

   Family on Medication Effects and Side Effects; Provide Appropriate

   and Timely Education Using Multiple Techniques; Provide Patient and

   Family with Support Group Information and Resources; Give Clear and

   Thorough Explanations and Demonstrations (SUHA Squires)

 Outcome:  Patient and Family will Verbalize Understanding of

   Condition, Treatment and Signs and Symptoms to Report (SUHA Squires)

 Outcome:  Patient will Identify Perceived Learning Needs and Express

   Motivation to Learn (SUHA Squires)

 Outcome:  Patient will Verbalize Understanding of Desired Content,

   and/or Performs Desired Skill Prior to Discharge (SUHA Squires)

   

=================================================================

Infection

=================================================================

   

 State:  Not Applicable (SUHA Squires)

 Related To:  Prolonged Labor or Induction (SUHA Squires)

 Goal(s):  The Patient will be Free of Infection, Vital Signs Stable

   and Lab Work within Normal Parameters (SUHA Squires)

 Interventions:  Instruct and Reinforce Proper Handwashing, Hygiene,

   and  Care Techniques to Patient and Family; Monitor Vital

   Signs; Monitor Patient for the Following Signs of Infection: Fever,

   Abdominal Tenderness, Unusual Discharge; Monitor Aminiotic Fluid,

   Urine and Lochia for Color and Odor; Observe Wounds, Incisions and

   Invasive Line Sites for Redness, Drainage and Edema; Assess IV Sites

   per Hospital Policy; Monitor Lab and Test Results and Notify

   Provider of Abnormal Findings; Assess Nutritional Status and Promote

   Good Nutrition (SUHA Squires)

 Outcome:  Patient will Remain Free of Infection (SUHA Squires)

 Outcome:  Infection will be Recognized Early to Allow for Prompt

   Treatment (SUHA Squires)

 Outcome:  Patient will have Vital Signs Within Expected Range

   (SUHA Squires)

   

=================================================================

Fluid Volume

=================================================================

   

 State:  Risk For (SUHA Squires)

 Related To:  Gestational Hypertension; Surgical Procedures; Prolonged

   Labor or Induction; Hemorrhage; Disease Process; Anesthesia; Altered

   Renal Function (SUHA Squires)

 Goal(s):  Patient will Achieve and Maintain a Balanced Fluid Volume

   Status; Hemodynamically Stable (SUHA Squires)

 Interventions:  Monitor Vital Signs; Auscultate Breath Sounds; Monitor

   Patient for Skin Turgor, Mucous Membranes, Dry Skin, Weakness,

   Headaches and Confusion; Provide Oral Fluids as Ordered; Initiate

   and Maintain Intravenous Fluids as Ordered; Monitor Intake and

   Output as Indicated Per Patient Status; Accurately Measure Blood

   Loss; Monitor Lab and Test Results as Obtained and Notify Provider

   of Abnormal Findings; Monitor Patient's Weight (SUHA Squires)

 Outcome:  Patient will have Clear Lung Sounds (SUHA Squires)

 Outcome:  Patient will have Vital Signs within Expected Range

   (SUHA Squires)

 Outcome:  Urine Output will be within Expected Range (SUHA Squires)

 Outcome:  Patient will have Minimal Generalized or Upper Extremity

   Edema (SUHA Squires)

   

=================================================================

Injury

=================================================================

   

 State:  Risk For (SUHA Squires)

 Related To:  Gestational Hypertension or Eclampsia; Anesthesia;

   Altered Coagulation (SUHA Squires)

 Goal(s):  Patient will Remain Free from Injury (SUHA Squires)

 Interventions:  Fetal Monitoring as per Hospital Protocol; Assess

   Neurological Status; Perform Risk Assessment of Patients with

   Induction and ; Perform Fall Risk Assessment and Prevention per

   Hospital Protocol; Perform DVT Risk Assessment and Prophylaxis per

   Hospital Protocol; Ensure that Oxygen, Suction, and Resuscitation

   Medications and Equipment are Readily Available; Confirm Patient ID

   Prior to Procedure(s) and Medication Administration per Hospital

   Policy (SUHA Squires)

 Outcome:  Successful Fall Risk Prevention (SUHA Squires)

 Outcome:  Patient will Deliver Infant without Adverse [place]ela

   (SUHA Squires)

 Outcome:  Patient's Neurological Status will Remain Stable (SUHA Squires)

   

=================================================================

Impaired Skin Integrity

=================================================================

   

 State:  Risk For (SUHA Squires)

 Related To:  Vaginal Delivery; Surgical Procedures; Prolonged Bedrest

   (SUHA Squires)

 Goal(s):  Patient will Maintain Optimal Skin Integrity, Free of

   Breakdown, Injury or Infection (SUHA Squires)

 Interventions:  Complete Screening for Pressure Ulcer Risk and

   Initiate Protocol per Hospital Policy; Monitor Site of Skin

   Impairment for Color Changes, Redness, Swelling, Warmth, Pain or

   Other Signs of  Infection; Encourage and Assist with Position

   Changes; Monitor Patient's Mobility Status; Provide Adequate

   Nutrition and Fluids; Teach Patient Appropriate Hygienic Care; Teach

   Patient/Family Skin Care Management (SUHA Squires)

 Outcome:  Patient will not have Evidence of Injury Such as Skin

   Breakdown, Scrapes, Cuts, or Bruising (SUHA Squires)

 Outcome:  Patient will Report Any Altered Sensation or Pain at Site of

   Skin Impairment (Mariaa Khalil RNC)

 Outcome:  Patients Incisions and Wounds will be without Signs or

   Symptoms of Infection (SUHA Squires)

 Outcome:  Patient will Demonstrate Understanding of Plan to Heal Skin

   and Prevent Reinjury and Verbalize Risk Factors (SUHA Squires)

   

=================================================================

Parenting Impaired

=================================================================

   

 State:  Not Applicable (Mariaa Remi, RNC)

   

=================================================================

Nutrition

=================================================================

   

 State:  Not Applicable (Mariaa Remi, RNC)

   

=================================================================

Grieving

=================================================================

   

 State:  Not Applicable (Mariaa Remi, RNC)

   

=================================================================

Additional Care Plan

=================================================================

   

 State:  Not Applicable (Mariaa Remi, RNC)

## 2017-02-16 NOTE — PDOC PROGRESS REPORT
Subjective-OB


Subjective: 


Post Delivery Day:








21 year old.  Denies any needs at this time 


Doing well, no c/o, mother holding baby, voiding, taking diet well, not wearing 

bra





Physical Exam (OB)


Vital Signs: 


 











Temp Pulse Resp BP Pulse Ox


 


 97.4 F   56 L  15   100/60   99 


 


 02/16/17 07:51  02/16/17 07:51  02/16/17 07:51  02/16/17 07:51  02/16/17 07:51








 Intake & Output











 02/15/17 02/16/17 02/17/17





 06:59 06:59 06:59


 


Weight  59 kg 














- Lochia


Lochia Amount: Small 10-25 ml


Lochia Color: Rubra/Red





- Abdomen


Description: Soft


Hernia Present: No


Fundal Description: Firm, Midline


Fundal Height: u/u - u/2





Objective-Diagnostic


Laboratory: 


 





 02/16/17 07:25 





 











  02/15/17 02/15/17 02/15/17





  10:07 10:39 10:39


 


WBC   15.5 H 


 


RBC   3.16 L 


 


Hgb   10.4 L 


 


Hct   30.6 L 


 


MCV   97 


 


MCH   33.0 


 


MCHC   34.1 


 


RDW   13.6 


 


Plt Count   199 


 


Seg Neutrophils %   89.3 H 


 


Lymphocytes %   8.7 L 


 


Monocytes %   2.0 L 


 


Eosinophils %   0.0 


 


Basophils %   0.0 


 


Absolute Neutrophils   13.8 H 


 


Absolute Lymphocytes   1.4 


 


Absolute Monocytes   0.3 


 


Absolute Eosinophils   0.0 


 


Absolute Basophils   0.0 


 


Urine Color  YELLOW  


 


Urine Appearance  SLIGHTLY-CLOUDY  


 


Urine pH  6.0  


 


Ur Specific Gravity  1.017  


 


Urine Protein  NEGATIVE  


 


Urine Glucose (UA)  NEGATIVE  


 


Urine Ketones  NEGATIVE  


 


Urine Blood  LARGE H  


 


Urine Nitrite  NEGATIVE  


 


Ur Leukocyte Esterase  TRACE H  


 


Blood Type    O POSITIVE


 


Antibody Screen    NEGATIVE














  02/16/17





  07:25


 


WBC  13.6 H


 


RBC  3.00 L


 


Hgb  10.0 L


 


Hct  28.8 L


 


MCV  96


 


MCH  33.2


 


MCHC  34.6


 


RDW  13.5


 


Plt Count  201


 


Seg Neutrophils % 


 


Lymphocytes % 


 


Monocytes % 


 


Eosinophils % 


 


Basophils % 


 


Absolute Neutrophils 


 


Absolute Lymphocytes 


 


Absolute Monocytes 


 


Absolute Eosinophils 


 


Absolute Basophils 


 


Urine Color 


 


Urine Appearance 


 


Urine pH 


 


Ur Specific Gravity 


 


Urine Protein 


 


Urine Glucose (UA) 


 


Urine Ketones 


 


Urine Blood 


 


Urine Nitrite 


 


Ur Leukocyte Esterase 


 


Blood Type 


 


Antibody Screen 














Assessment and Plan(PN)





- Assessment and Plan


(1) Anemia


Qualifiers: 


     Anemia type: iron deficiency


Is this a current diagnosis for this admission?: Yes





(2) Pregnancy


Qualifiers: 


     Weeks of gestation: 40 weeks        Qualified Code(s): Z3A.40 - 40 weeks 

gestation of pregnancy  


Is this a current diagnosis for this admission?: Yes





(3) Vaginal delivery


Is this a current diagnosis for this admission?: Yes








- Time Spent with Patient


Time with patient: Less than 15 minutes


Medications reviewed and adjusted accordingly: Yes





- Disposition


Anticipated Discharge: Home


Within: within 24 hours - wear bra

## 2017-02-16 NOTE — L&D CURRENT ADMISSION
=================================================================

Current Admit

=================================================================

Datetime Report Generated by CPN: 02/16/2017 06:00

   

   

=================================================================

ADMISSION INFORMATION

=================================================================

   

Current Admit Date/Time:  02/15/2017 10:30    (02/15/2017

   05:04:SUHA Squires)

Reason for Admission:  Onset of Labor    (02/15/2017 05:04:SUHA Squires)

Chief Complaint:  Contractions    (02/15/2017 10:12:SUHA Squires)

Medications During Pregnancy:  Prenatal Vitamin    (02/15/2017

   05:04:SUHA Squires)

EGA per Dates:  37.6    (02/15/2017 05:04:QS system process)

Method of Arrival:  Wheelchair    (02/15/2017 05:04:SUHA Squires)

Admitted From:  Dr. Rg    (02/15/2017 05:04:SUHA Squires)

Reason for Induction:  Not Applicable    (02/15/2017 05:04:SUHA Squires)

Prenatal Records Available:  Yes    (02/15/2017 05:04:SUHA Squires)

General Admission Information:  Reviewed    (02/15/2017 05:04:SUHA Squires)

General Admission Reviewed By:  HERNANDO BORDEN    (02/15/2017

   05:04:SUHA Squires)

   

=================================================================

BELONGINGS/ADVANCED DIRECTIVES

=================================================================

   

Valuables/Personal Effects:  Purse/Wallet    (02/15/2017

   05:04:SUHA Squires)

Disposition of Belongings:  Kept with Patient    (02/15/2017

   05:04:SUHA Squires)

Advance Direct for Healthcare:  No, and Wants No Information   

   (02/15/2017 05:04:SUHA Squires)

Durable Power of :  No    (02/15/2017 05:04:SUHA Squires)

Living Will:  No    (02/15/2017 05:04:SUHA Squires)

Organ Donor:  No    (02/15/2017 05:04:SUHA Squires)

Pt Rights Information Given:  Yes    (02/15/2017 05:04:SUHA Squires)

Pt Understands Pt Rights:  Yes    (02/15/2017 05:04:SUHA Squires)

   

=================================================================

LEARNING ASSESSMENT

=================================================================

   

Knowledge Level:  Understands L_D Process; Understands Care Activities 

   (02/15/2017 05:04:SUHA Squires)

Barriers to Learning:  None    (02/15/2017 05:04:SUHA Squires)

Learning Readiness:  Motivated    (02/15/2017 05:04:SUHA Squires)

Learns Best By:  1 to 1 Instruction    (02/15/2017 05:04:SUHA Squires)

Learning Needs:  Labor and Delivery Process; Pain Management; Symptoms

   to Report; Treatment Plan    (02/15/2017 05:04:SUHA Squires)

   

=================================================================

DOMESTIC VIOLANCE SCREENING

=================================================================

   

Dom Viol Threatened/Hurt:  No    (02/15/2017 05:04:SUHA Squires)

Hx of Abuse/Neglect past 2yrs:  No    (02/15/2017 05:04:SUHA Squires)

Feel Unsafe Going Home:  No    (02/15/2017 05:04:SUHA Squires)

Addt'l Observ Indicating Abuse:  No    (02/15/2017 05:04:SUHA Squires)

Reason Unable to Complete Screen:  N/A, Screen Completed    (02/15/2017

   05:04:SUHA Squires)

Considered Personal Harm/Suicide:  No    (02/15/2017 05:04:SUHA Squires)

   

=================================================================

NUTRITIONAL/FUNCTIONAL SCREENING

=================================================================

   

Problem with Appetite >5 Days:  No    (02/15/2017 05:04:SUHA Squires)

Chew/Swallow Difficulties:  No    (02/15/2017 05:04:SUHA Squires)

Inappropriate Wt Gain/Loss:  No    (02/15/2017 05:04:SUHA Squires)

Presence Skin Breakdown/Ulcer:  No    (02/15/2017 05:04:SUHA Squires)

Special Diet:  No    (02/15/2017 05:04:SUHA Squires)

Pt Requests Dietician Visit:  No    (02/15/2017 05:04:SUHA Squires)

Hx of Any of the Following?:  N/A    (02/15/2017 05:04:SUHA Squires)

New Diagnosis of:  N/A    (02/15/2017 05:04:SUHA Squires)

Requires Assist w/Ambulation:  No    (02/15/2017 05:04:SUHA Squires)

Uses Assist Device to Ambulate:  No    (02/15/2017 05:04:SUHA Squires)

Pt Requires Help w/ADL's:  No    (02/15/2017 05:04:SUHA Squires)

## 2017-02-17 NOTE — PDOC PROGRESS REPORT
Subjective-OB


Subjective: 


Post Delivery Day:








21 year old.  Denies any needs at this time 


Doing well, ready to go home, voiding, eating well, bottle feeding, would like 

motrin for pain,





Physical Exam (OB)


Vital Signs: 


 











Temp Pulse Resp BP Pulse Ox


 


 98.1 F   62   16   95/45 L  99 


 


 02/17/17 08:03  02/17/17 08:03  02/17/17 08:03  02/17/17 08:03  02/17/17 08:03








 Intake & Output











 02/16/17 02/17/17 02/18/17





 06:59 06:59 06:59


 


Weight 59 kg  














- Lochia


Lochia Amount: Scant < 10 ml


Lochia Color: Rubra/Red





- Abdomen


Description: Tender, Soft


Hernia Present: No


Fundal Description: Firm, Midline


Fundal Height: u/u - u/2





Objective-Diagnostic


Laboratory: 


 





 02/16/17 07:25 











Assessment and Plan(PN)





- Assessment and Plan


(1) Anemia


Qualifiers: 


     Anemia type: iron deficiency


Is this a current diagnosis for this admission?: Yes





(2) Pregnancy


Qualifiers: 


     Weeks of gestation: 37 weeks        Qualified Code(s): Z3A.37 - 37 weeks 

gestation of pregnancy  


Is this a current diagnosis for this admission?: Yes





(3) Vaginal delivery


Is this a current diagnosis for this admission?: Yes








- Time Spent with Patient


Time with patient: Less than 15 minutes


Medications reviewed and adjusted accordingly: Yes





- Disposition


Anticipated Discharge: Home


Within: Other - home today, F/U with PCP for anxiety

## 2017-02-17 NOTE — PDOC DISCHARGE SUMMARY
Final Diagnosis


Discharge Date: 17





- Final Diagnosis


(1) Anemia


Is this a current diagnosis for this admission?: Yes





(2) Pregnancy


Is this a current diagnosis for this admission?: Yes





(3) Vaginal delivery


Is this a current diagnosis for this admission?: Yes








Discharge Data





- Discharge Medication


Home Medications: 








Prenatal Comb No.42/Folic Acid [Prena1 Chew Tablet] 1 tab PO DAILY 05/04/15 


Ibuprofen [Motrin 800 mg Tablet] 800 mg PO Q8 #60 tablet 17 








Reason(s) for Admission: Onset of Labor


Prenatal Procedures: Ultrasound


Intrapartum Procedure(s): Spontaneous Vaginal Delivery


Postpartum Complication(s): Laceration-Perineal


Laceration-Degree: 1st





- [place] Data


  ** Baby 1 Male


Apgar at 1 minute: 8


Apgar at 5 minutes: 9


Weight: 2.863 kg


Home with Mother: Yes


Complications: No





- Diagnosis Test


Laboratory: 


 











Temp Pulse Resp BP Pulse Ox


 


 98.1 F   62   16   95/45 L  99 


 


 17 08:03  17 08:03  17 08:03  17 08:03  17 08:03








 











  02/15/17 02/15/17 02/16/17





  10:07 10:39 07:25


 


RBC   3.16 L  3.00 L


 


Hgb   10.4 L  10.0 L


 


Hct   30.6 L  28.8 L


 


Urine Opiates Screen  NEGATIVE  














- Discharge information/Instructions


Discharge Activity: Activity As Tolerated, No Lifting Over 10 Pounds, Pelvic 

Rest, No tub bath


Discharge Diet: As Tolerated, Regular


Disposition: HOME, SELF-CARE


Follow up with: [facility]


in: 4, Weeks

## 2017-02-20 NOTE — ADMISSION PHYSICAL
=================================================================



=================================================================

Datetime Report Generated by RUPAL: 2017 14:31

   

Chief Complaint:  Uterine Contractions

Admit Plan:  Admit to Unit; Initiate Labor Protocol

General:  Normal

HEENT:  Normal

Neurologic:  Normal

Thyroid:  Normal

Heart:  Normal

Lungs:  Normal

Breast:  Normal

Back:  Normal

Abdomen:  Normal

Genitourinary Exam:  Normal

Extremities:  Normal

DTRs:  Normal

Pelvic Type:  Adequate

Vital Signs:  Reviewed

Dilatation:  8

Effacement:  90

Station:  -1

Membranes:  Intact

Monitoring:  External US

Decelerations:  None

Fetal Presentation:  Vertex

Admit Comment:  20 yo  EDc  (Annotations: Data stored by RUPAL on

   behalf of user)

## 2025-05-07 NOTE — L&D GENERAL ADMISSION
=================================================================

General Admit

=================================================================

Datetime Report Generated by CPN: 2017 06:00

   

   

=================================================================

PREGNANCY INFORMATION

=================================================================

   

Patient Age:  21    (2017 08:58:QS system process)

EDC:  2017 00:00    (2017 18:54:Tammie Browning RN)

:  2    (2017 18:54:Tammie Browning RN)

Para:  1    (02/15/2017 06:25:Eamon Brown RN)

Baby, Number in Womb:  1    (02/15/2017 06:25:Eamon Brown RN)

   

=================================================================

PRENATAL CARE

=================================================================

   

Primary Obstetrician:  [facility]    (2017

   18:54:Tammie Browning RN)

Adequate Prenatal Care:  Yes    (2017 18:54:Ifrah Stinson RN)

Height (in):  64    (02/15/2017 16:16:QS system process)

   

=================================================================

ALLERGIES

=================================================================

   

Medication Allergy:  Yes    (2017 18:54:Ifrah Stinson RN)

Medication Allergies:  Penicillins/MO/Generalized ella (02/15/2017);

   amoxicillin/MO/Generalized ella (02/15/2017)    (02/15/2017 10:27:QS

   system process)

Latex Allergy:  No Latex Allergies    (2017 18:54:Ifrah Stinson RN)

Food Allergies:  NA     (2017 18:54:Ifrah Stinson RN)

Environmental Allergies:  NA    (2017 18:54:Ifrah Stinson RN)

   

=================================================================

COMMUNICATION

=================================================================

   

Primary Language:  English    (2017 18:54:Ifrah Stinson RN)

Medical Tx Preferred Language:  English    (2017 18:54:Ifrah Stinson RN)

Communication Barrier(s):  None    (2017 18:54:Ifrah Stinson RN)

   

=================================================================

DEMOGRAPHICS

=================================================================

   

Address:  [address] [place] [address] [employer] [facility] 

   [place]   [address]    (02/15/2017 05:05:QS system process)

Zipcode:  [id number]    (02/15/2017 05:05:QS system process)

Home Telephone:  [phone]    (2017 08:58:QS system process)

SSN:      (2017 08:58:QS system process)

Next of Kin Name:  EAMON BINGHAM    (2017 08:58:QS system

   process)

Next of Kin Phone:  [phone]    (2017 08:58:QS system

   process)

Next of Kin Relationship:  MO    (2017 08:58:QS system process)

YOB: 1995    (2017 08:58:QS system process)

Marital Status:  Single    (2017 08:58:QS system process)

Sex:  Female    (2017 08:58:QS system process)

Race:      (2017 08:58:QS system process)

Ethnicity:  Non- or     (2017 08:58:QS system

   process)

Yarsanism:  Advent    (2017 08:58:QS system process)

   

=================================================================

DRUG AND ALCOHOL USE

=================================================================

   

Alcohol:  No    (2017 18:54:Crystal Milad, RN)

Cigarettes:  Never Smoker. [id number]    (2017 18:54:Crystal

   Milad, RN)

Marijuana:  No    (2017 18:54:[place], RN)

Cocaine:  No    (2017 18:54:Crystal Milad, RN)

Other Illicit Drugs:  No    (2017 18:54:[place], RN)

   

=================================================================

VACCINE HISTORY

=================================================================

   

Influenza Vaccine:  No    (2017 18:54:[place], RN)

Pneumococcal Vaccine:  No    (2017 18:54:[place], RN)

Tetanus Vaccine:  Yes    (2017 18:54:Crystal [place], RN)

Tdap Vaccine:  Yes    (2017 18:54:Crystal Milad, RN)

Hepatitis B Vaccine:  No    (2017 18:54:Crystal Mil[place], RN)

   

=================================================================



=================================================================

   

Pediatrician:  [facility]    (2017

   18:54:Ifrah Stinson RN)

Feeding Preference:  Formula    (2017 18:54:Ifrah Stinson RN)

Benefit of Breast Feed Discussed:  Yes    (2017 18:54:Ifrah Stinson RN)

Circumcision:  Yes    (2017 18:54:Ifrah Stinson RN)

Prenatal Classes Attended:  No    (2017 18:54:Ifrah Stinson RN)

Tubal Ligation:  No    (2017 18:54:Ifrah Stinson RN)

Tubal Authorization Signed:  N/A    (2017 18:54:Ifrah Stinson RN)

 Consent:  N/A    (2017 18:54:Ifrah Stinson RN)

 Consent Signed:  N/A    (2017 18:54:Ifrah Stinson RN)

Pain Management Plans:  Epidural    (2017 18:54:Ifrah Stinson RN)

Plans for Labor and Delivery:  None    (2017 18:54:Ifrah Stinson RN)

Support Person:  Yossi Bruno    (2017 18:54:Ifrah Stinson RN)

Support Person Relationship:      (2017 18:54:Ifrah Stinson RN)

Cultural/Spritual Practice:  No    (2017 18:54:Ifrah Stinson RN)

Spir/Cult Dietary Needs:  No    (2017 18:54:Ifrah Stinson RN)

   

=================================================================

LIVING SITUATION/DISCHARGE PLAN

=================================================================

   

Living Arrangements:  House    (2017 18:54:Ifrah Stinson RN)

Adequate Access to::  Electric; Heat; Refrigeration; Plumbing/Running

   water; Phone; Transportation    (2017 18:54:Ifrah Stinson RN)

WIC Program:  Yes    (2017 18:54:Ifrah Stinson RN)

Discharge  Person:  Yossi Bruno    (2017 18:54:Ifrah Stinson RN)

Person to Help after Discharge:  Yossi Bruno    (2017

   18:54:Ifrah Stinson RN)

Currently Using [employer]:  Yes    (2017 18:54:Ifrah Stinson RN)

Outside Agency/:  Yes    (2017 18:54:Ifrah Stinson RN)

Car Seat for Discharge:  Yes    (2017 18:54:Ifrah Stinson RN)

Adoption Requested:  No    (2017 18:54:Tammie Browning RN)

Pt Contact w/infant Post Birth:  N/A    (2017 18:54:Ifrah Stinson RN)

   

=================================================================

PRENATAL LABS

=================================================================

   

Blood Type:  O Positive    (2017 18:54:Ifrah Stinson RN)

Antibody Screen:  Negative    (2017 18:54:Ifrah Stinson RN)

Hemoglobin:  10.4 L    (02/15/2017 10:39:QS system process)

Hematocrit:  30.6 L    (02/15/2017 10:39:QS system process)

MCV:  97    (02/15/2017 10:39:QS system process)

Group Beta Strep:  Negative    (2017 18:54:Ifrah Stinson RN)

Gonorrhea:  Negative    (2017 18:54:Ifrah Stinson RN)

Chlamydia:  Negative    (2017 18:54:Ifrah Stinson RN)

RPR/VDRL:  Nonreactive    (2017 18:54:Ifrah Stinson RN)

Hepatitis B:  Negative    (2017 18:54:SUHA Squires)

Rubella:  Immune    (2017 18:54:Ifrah Stinson RN)

   

=================================================================

OB/PREVIOUS PREGNANCY HISTORY

=================================================================

   

Current Pregnancy Procedures:  Ultrasound    (2017 18:54:Ifrah Stinson RN)

History of Previous :  No    (2017 18:54:Ifrah Stinson RN)

History of Gestational Diabetes:  No    (2017 18:54:Ifrah Stinson RN)

History of PIH:  No    (2017 18:54:Ifrah Stinson RN)

History of Incompetent Cervix:  No    (2017 18:54:Ifrah Stinson RN)

History of Placenta Previa/Abrup:  No    (2017 18:54:Ifrah Stinson RN)

History of Macrosomia:  No    (2017 18:54:Ifrah Stinson RN)

History of IUGR:  No    (2017 18:54:Ifrah Stinson RN)

History of Postpartum Hemorrhage:  No    (2017 18:54:Ifrah Stinson RN)

History of Loss/Stillborn:  No    (2017 18:54:Ifrah Stinson RN)

History of  Death:  No    (2017 18:54:Ifrah Stinson RN)

History of D (Rh) Sensitization:  No    (2017 18:54:Ifrah Stinson RN)

History Recurrent Loss/Stillborn:  No    (2017 18:54:Ifrah Stinson RN)

History Depression/PP Depression:  No    (2017 18:54:Ifrah Stinson RN)

History of  Uterine Anomaly/DEVON:  No    (2017 18:54:Ifrah Stinson RN)

History of Infertility:  No    (2017 18:54:Ifrah Stinson RN)

History of  ART Treatment:  No    (2017 18:54:Ifrah Stinson RN)

History of DEVON:  No    (2017 18:54:Ifrah Stinson RN)

Comments Obstetrical History:  G1 NVD 2015 boy

   G2 current pregnancy    (2017 18:54:Tammie Browning RN)

   

=================================================================

MEDICAL HISTORY

=================================================================

   

Med Hx Diabetes:  No    (2017 18:54:Ifrah Stinson RN)

Med Hx Hypertension:  No    (2017 18:54:Ifrah Stinson RN)

Med Hx Heart Disease:  No    (2017 18:54:Ifrah Stinson RN)

Med Hx Autoimmune Disorder:  No    (2017 18:54:Ifrah Stinson RN)

Med Hx Kidney Disease/UTI:  No    (2017 18:54:Ifrah Stinson RN)

Med Hx Neurologic/Epilepsy:  No    (2017 18:54:Ifrah Stinson RN)

Med Hx Psychiatric Disorders:  No    (2017 18:54:Ifrah Stinson RN)

Med Hx Hepatitis/Liver Disease:  No    (2017 18:54:Ifrah Stinson RN)

Med Hx Varicosities/Phlebitis:  No    (2017 18:54:Ifrah Stinson RN)

Med Hx Thyroid Dysfunction:  No    (2017 18:54:Ifrah Stinson RN)

Med Hx Trauma/Violence:  No    (2017 18:54:Ifrah Stinson RN)

Med Hx Blood Transfusion:  No    (2017 18:54:Ifrah Stinson RN)

Med Hx Pulmonary (Asthma,TB):  No    (2017 18:54:Ifrah Stinson RN)

Med Hx Breast:  No    (2017 18:54:Ifrah Stinson RN)

Med Hx GYN Surgery:  No    (2017 18:54:Ifrah Stinson RN)

Med Hx Hospitalization/Surgery:  Yes    (2017 18:54:Tammie Browning RN)

Med Hx Anesthetic Complications:  No    (2017 18:54:Ifrah Stinson RN)

Med Hx Abnormal Pap Smear:  No    (2017 18:54:Ifrah Stinson RN)

Other Medical Diseases:  No    (2017 18:54:Ifrah Stinson RN)

Med Hx Significant Family Hx:  No    (2017 18:54:Ifrah Stinson RN)

Details of Med/Surg Hx:  childbirth    (2017 18:54:Tammie Browning RN)

   

=================================================================

INFECTIOUS HISTORY

=================================================================

   

Inf Hx Gonorrhea:  No    (2017 18:54:Ifrah Stinson RN)

Inf Hx Chlamydia:  No    (2017 18:54:Ifrah Stinson RN)

Inf Hx Syphilis:  No    (2017 18:54:Ifrah Stinson RN)

Inf Hx HIV/AIDS:  No    (2017 18:54:Ifrah Stinson RN)

Inf Hx Human Papilloma Virus:  No    (2017 18:54:Ifrah Stinson RN)

Inf Hx Pt/Partner Genital Herpes:  No    (2017 18:54:Ifrah Stinson RN)

Inf Hx Tuberculosis/Exposure:  No    (2017 18:54:Ifrah Stinson RN)

Inf Hx Hepatitis B,C:  No    (2017 18:54:Ifrah Stinson RN)

Inf Hx Rash or Viral Illness:  No    (2017 18:54:Ifrah Stinson RN)

   

=================================================================

GENETIC HISTORY

=================================================================

   

Gen Hx Age >=35 at [place]:  No    (2017 18:54:Ifrah Stinson RN)

Gen Hx Thalassemia:  No    (2017 18:54:Ifrah Stinson RN)

Gen Hx Congenital Heart Defect:  No    (2017 18:54:Ifrah Stinson RN)

Gen Hx Neural Tube Defect:  No    (2017 18:54:Ifrah Stinson RN)

Gen Hx Down's Syndrome:  No    (2017 18:54:Ifrah Stinson RN)

Gen Hx Luis A-Sachs:  No    (2017 18:54:Ifrah Stinson RN)

Gen Hx [place]:  No    (2017 18:54:Ifrah Stinson RN)

Gen Hx Familial Dysautonomia:  No    (2017 18:54:Ifrah Stinson RN)

Gen Hx Sickle Cell Disease/Trait:  No    (2017 18:54:Ifrah Stinson RN)

Gen Hx Hemophilia/Blood Disorder:  No    (2017 18:54:Ifrah Stinson RN)

Gen Hx Muscular Dystrophy:  No    (2017 18:54:Ifrah Stinson RN)

Gen Hx Cystic Fibrosis:  No    (2017 18:54:Ifrah Stinson RN)

Gen Hx Huntingtons Chorea:  No    (2017 18:54:Ifrah Stinson RN)

Gen Hx Mental Retardation/Autism:  No    (2017 18:54:Ifrah Stinson RN)

Gen Hx Tested for Fragile X:  No    (2017 18:54:Ifrah Stinson RN)

Gen Hx Other Inher/Chromosomal:  No    (2017 18:54:Ifrah Stinson RN)

Gen Hx Maternal Metabolic DO:  No    (2017 18:54:Ifrah Stinson RN)

Gen Hx Pt Father or FOB Defect:  No    (2017 18:54:Ifrah Stinson RN)

Gen Hx Other Genetic History:  No    (2017 18:54:Ifrah Stinson RN)

Gen Hx Drugs/Meds since LMP:  No    (2017 18:54:Ifrah Stinson RN)
Yes